# Patient Record
Sex: MALE | URBAN - METROPOLITAN AREA
[De-identification: names, ages, dates, MRNs, and addresses within clinical notes are randomized per-mention and may not be internally consistent; named-entity substitution may affect disease eponyms.]

---

## 2021-10-18 ENCOUNTER — NURSE TRIAGE (OUTPATIENT)
Dept: OTHER | Facility: OTHER | Age: 46
End: 2021-10-18

## 2021-10-18 DIAGNOSIS — Z20.822 SUSPECTED SEVERE ACUTE RESPIRATORY SYNDROME CORONAVIRUS 2 (SARS-COV-2) INFECTION: Primary | ICD-10-CM

## 2021-10-18 PROCEDURE — U0003 INFECTIOUS AGENT DETECTION BY NUCLEIC ACID (DNA OR RNA); SEVERE ACUTE RESPIRATORY SYNDROME CORONAVIRUS 2 (SARS-COV-2) (CORONAVIRUS DISEASE [COVID-19]), AMPLIFIED PROBE TECHNIQUE, MAKING USE OF HIGH THROUGHPUT TECHNOLOGIES AS DESCRIBED BY CMS-2020-01-R: HCPCS | Performed by: FAMILY MEDICINE

## 2021-10-18 PROCEDURE — U0005 INFEC AGEN DETEC AMPLI PROBE: HCPCS | Performed by: FAMILY MEDICINE

## 2021-10-19 LAB — SARS-COV-2 RNA RESP QL NAA+PROBE: NEGATIVE

## 2025-07-11 ENCOUNTER — OFFICE VISIT (OUTPATIENT)
Dept: URGENT CARE | Facility: CLINIC | Age: 50
End: 2025-07-11
Payer: COMMERCIAL

## 2025-07-11 VITALS
OXYGEN SATURATION: 98 % | HEART RATE: 83 BPM | HEIGHT: 73 IN | BODY MASS INDEX: 31.23 KG/M2 | SYSTOLIC BLOOD PRESSURE: 136 MMHG | TEMPERATURE: 97.6 F | DIASTOLIC BLOOD PRESSURE: 78 MMHG | WEIGHT: 235.6 LBS | RESPIRATION RATE: 20 BRPM

## 2025-07-11 DIAGNOSIS — S90.852A ACUTE FOREIGN BODY OF LEFT FOOT, INITIAL ENCOUNTER: Primary | ICD-10-CM

## 2025-07-11 PROCEDURE — 99213 OFFICE O/P EST LOW 20 MIN: CPT | Performed by: PHYSICIAN ASSISTANT

## 2025-07-11 RX ORDER — MUPIROCIN 2 %
OINTMENT (GRAM) TOPICAL 3 TIMES DAILY
Qty: 30 G | Refills: 0 | Status: SHIPPED | OUTPATIENT
Start: 2025-07-11

## 2025-07-11 RX ORDER — ATORVASTATIN CALCIUM 40 MG/1
TABLET, FILM COATED ORAL
COMMUNITY
Start: 2025-06-12

## 2025-07-11 NOTE — PATIENT INSTRUCTIONS
We discussed as it has been 4 weeks and there is no sign of infection at this time I will not attempt removal, the foreign body is not visualized or palpated. He can do warm epsom salt soaks and can apply Bactroban. Podiatry referral was given. We discussed red flag signs and ED precautions. The patient expresses understanding. He has declined Tdap at this time.

## 2025-07-11 NOTE — PROGRESS NOTES
[facility]        NAME: Marcelo Pagan is a 50 y.o. male  : 1975    MRN: [id number]  DATE: 2025  TIME: 11:07 AM    Assessment and Plan   Acute foreign body of left foot, initial encounter [S90.852A]  1. Acute foreign body of left foot, initial encounter  mupirocin (BACTROBAN) 2 % ointment    Ambulatory Referral to Podiatry            Patient Instructions   We discussed as it has been 4 weeks and there is no sign of infection at this time I will not attempt removal, the foreign body is not visualized or palpated. He can do warm epsom salt soaks and can apply Bactroban. Podiatry referral was given. We discussed red flag signs and ED precautions. The patient expresses understanding. He has declined Tdap at this time.     Follow up with PCP in 3-5 days.  Proceed to  ER if symptoms worsen.    If tests have been performed at [facility], our office will contact you with results if changes need to be made to the care plan discussed with you at the visit.  You can review your full results on [facility] MyChart.    Chief Complaint     Chief Complaint   Patient presents with    Foreign Body in Skin     Pt here for concerns of a splinter in  left foot pt states  in place x 4 weeks.  Pt states  it is below the skin and is tender. Pain  3/10.          History of Present Illness       Marcelo presents today for concern for foreign body in the left foot. The patient states that he feels he sustained a wood splinter in the foot four weeks ago. He states that he has hardwood floors, his foot slid and a thin piece of the wood went into over the sole of the foot. He would like to attempt removal today. He states that the area is mildly tender. No erythema, oozing or drainage. Gait is normal. He has full ROM of the toes. No fever or chills. No OTC measures attempted.     His last Tdap is unknown, he has declined updating at this time.         Review of Systems   Review of Systems   Constitutional:  Negative for  "activity change, appetite change, chills, fatigue and fever.   Musculoskeletal:  Negative for arthralgias, back pain, gait problem, joint swelling, myalgias, neck pain and neck stiffness.   Skin:  Positive for wound. Negative for color change, pallor and rash.   Allergic/Immunologic: Negative for immunocompromised state.   Neurological:  Negative for dizziness, weakness, light-headedness and numbness.   Hematological:  Does not bruise/bleed easily.         Current Medications     Current Medications[1]    Current Allergies     Allergies as of 07/11/2025    (No Known Allergies)            The following portions of the patient's history were reviewed and updated as appropriate: allergies, current medications, past family history, past medical history, past social history, past surgical history and problem list.     Past Medical History[2]    Past Surgical History[3]    Family History[4]      Medications have been verified.        Objective   /78 (Patient Position: Sitting, Cuff Size: Large)   Pulse 83   Temp 97.6 °F (36.4 °C) (Tympanic)   Resp 20   Ht 6' 1\" (1.854 m)   Wt 107 kg (235 lb 9.6 oz)   SpO2 98%   BMI 31.08 kg/m²   No LMP for male patient.       Physical Exam     Physical Exam  Vitals and nursing note reviewed.   Constitutional:       General: He is not in acute distress.     Appearance: He is well-developed. He is not ill-appearing, toxic-appearing or diaphoretic.     Cardiovascular:      Rate and Rhythm: Normal rate and regular rhythm.      Heart sounds: Normal heart sounds, S1 normal and S2 normal.   Pulmonary:      Effort: Pulmonary effort is normal.      Breath sounds: Normal breath sounds and air entry.     Musculoskeletal:      Left foot: Normal range of motion and normal capillary refill. Tenderness present. No swelling, deformity, laceration, bony tenderness or crepitus. Normal pulse.      Comments: Left Foot: There is tenderness over the head of the 3rd metatarsal on the volar aspect " of the foot where the patient states that splinter entered. No foreign body was visualized or palpated. There is no erythema or edema. No oozing or drainage. He has full ROM of the toes and ankle. He has normal gait. Normal sensation.      Skin:     General: Skin is warm and dry.      Capillary Refill: Capillary refill takes less than 2 seconds.      Findings: No bruising, ecchymosis or erythema.     Neurological:      General: No focal deficit present.      Mental Status: He is alert and oriented to person, place, and time.      Sensory: Sensation is intact. No sensory deficit.      Motor: Motor function is intact. No weakness or abnormal muscle tone.      Gait: Gait is intact. Gait normal.     Psychiatric:         Behavior: Behavior normal.                        [1]   Current Outpatient Medications:     mupirocin (BACTROBAN) 2 % ointment, Apply topically 3 (three) times a day, Disp: 30 g, Rfl: 0    atorvastatin (LIPITOR) 40 mg tablet, , Disp: , Rfl:   [2]   Past Medical History:  Diagnosis Date    High cholesterol    [3]   Past Surgical History:  Procedure Laterality Date    NO PAST SURGERIES     [4]   Family History  Problem Relation Name Age of Onset    Diabetes Mother      Lung cancer Mother

## 2025-07-15 ENCOUNTER — TELEPHONE (OUTPATIENT)
Age: 50
End: 2025-07-15

## 2025-07-17 ENCOUNTER — APPOINTMENT (OUTPATIENT)
Dept: RADIOLOGY | Facility: CLINIC | Age: 50
End: 2025-07-17
Attending: STUDENT IN AN ORGANIZED HEALTH CARE EDUCATION/TRAINING PROGRAM
Payer: COMMERCIAL

## 2025-07-17 ENCOUNTER — OFFICE VISIT (OUTPATIENT)
Age: 50
End: 2025-07-17
Attending: PHYSICIAN ASSISTANT
Payer: COMMERCIAL

## 2025-07-17 VITALS — WEIGHT: 235 LBS | BODY MASS INDEX: 31.14 KG/M2 | HEIGHT: 73 IN | RESPIRATION RATE: 18 BRPM

## 2025-07-17 DIAGNOSIS — S90.852A ACUTE FOREIGN BODY OF LEFT FOOT, INITIAL ENCOUNTER: ICD-10-CM

## 2025-07-17 DIAGNOSIS — E11.9 TYPE 2 DIABETES MELLITUS WITHOUT COMPLICATION, WITHOUT LONG-TERM CURRENT USE OF INSULIN (HCC): Primary | ICD-10-CM

## 2025-07-17 PROCEDURE — 99203 OFFICE O/P NEW LOW 30 MIN: CPT | Performed by: STUDENT IN AN ORGANIZED HEALTH CARE EDUCATION/TRAINING PROGRAM

## 2025-07-17 PROCEDURE — 73630 X-RAY EXAM OF FOOT: CPT

## 2025-07-17 PROCEDURE — 28190 REMOVAL OF FOOT FOREIGN BODY: CPT | Performed by: STUDENT IN AN ORGANIZED HEALTH CARE EDUCATION/TRAINING PROGRAM

## 2025-07-20 NOTE — PROGRESS NOTES
"Name: Marcelo Pagan      : 1975      MRN: [id number]  Encounter Provider: Brown Jay DPM  Encounter Date: 2025   Encounter department: [facility] [place]  :  Assessment & Plan  Acute foreign body of left foot, initial encounter    Orders:    Ambulatory Referral to Podiatry    XR foot 3+ vw left; Future    Foreign body removal  Marcelo and I discussed his left foot in detail.  Subcutaneous foreign body was removed as described below.  No local signs of infection noted, the small wound was redressed with Xeroform, a Band-Aid, and offloading padding.  Patient was counseled on local and systemic signs of infection and should call my office immediately should any of these occur.  He will return to my office in 2 weeks for reevaluation.  Weightbearing x-rays of the left foot from 2025 were interpreted independently prior to foreign body removal revealing no obvious signs of foreign body in the soft tissues.  Type 2 diabetes mellitus without complication, without long-term current use of insulin (HCC)    No results found for: \"HGBA1C\"    Orders:    Ambulatory Referral to Endocrinology; Future      Universal Protocol:  procedure performed by consultantConsent: Verbal consent obtained  Risks and benefits: risks, benefits and alternatives were discussed  Consent given by: patient  Time out: Immediately prior to procedure a \"time out\" was called to verify the correct patient, procedure, equipment, support staff and site/side marked as required.  Patient understanding: patient states understanding of the procedure being performed  Patient identity confirmed: verbally with patient  Foreign body removal    Date/Time: 2025 10:45 AM    Performed by: Brown Jay DPM  Authorized by: Brown Jay DPM  Body area: skin  General location: lower extremity  Location details: left foot    Sedation:  Patient sedated: no  Patient restrained: no  Patient cooperative: yes  Localization method: " "visualized  Removal mechanism: forceps and scalpel  Dressing: dressing applied  Tendon involvement: none  Depth: subcutaneous  Complexity: simple  1 objects recovered.  Objects recovered: Wooden splinter  Post-procedure assessment: foreign body removed  Patient tolerance: patient tolerated the procedure well with no immediate complications          History of Present Illness   7/17/2025: Cristopher is a pleasant 50-year-old male who presents today for evaluation of his left foot.  He believes that he got a splinter to his left foot approximately 6 weeks ago.  He has been soaking it with Epsom salts however there has been no relief.  There is still pain when he walks, he also states that whenever the skin above it is D removed pus comes out.  He denies any systemic signs of infection.          Review of Systems   Constitutional: Negative.    Respiratory: Negative.     Cardiovascular: Negative.    Gastrointestinal: Negative.    Genitourinary: Negative.    Skin:  Positive for color change and wound.          Objective   Resp 18   Ht 6' 1\" (1.854 m)   Wt 107 kg (235 lb)   BMI 31.00 kg/m²      Physical Exam  Constitutional:       Appearance: He is not ill-appearing.     Cardiovascular:      Rate and Rhythm: Normal rate.      Pulses:           Dorsalis pedis pulses are 2+ on the right side and 2+ on the left side.        Posterior tibial pulses are 2+ on the right side and 2+ on the left side.   Pulmonary:      Effort: No respiratory distress.     Neurological:      Mental Status: He is alert and oriented to person, place, and time.      Sensory: No sensory deficit.       Vascular:  -DP and PT pulses intact b/l  -Capillary refill time <2 sec b/l  -Digital hair growth: Present  -Skin temp: WNL    MSK:  -Pain on palpation left plantar foot  -No gross deformities noted   -MMT is 5/5 to all muscle compartments of the lower extremity  -Ankle dorsiflexion >10 degrees with knee extended and knee flexed b/l    Neuro:  -Light " sensation intact bilaterally  -Protective sensation intact bilaterally with 10/10 points felt with [place] monofilament    Derm:  -A small wound measuring 0.1 cm x 0.1 cm x 0.1 cm to the level of subcutaneous tissue where splinter was protruding.  There are no local signs of infection to this area.  -No noted interdigital maceration, peeling, malodor  -No callus formation noted on exam

## 2025-07-20 NOTE — ASSESSMENT & PLAN NOTE
Orders:    Ambulatory Referral to Podiatry    XR foot 3+ vw left; Future    Foreign body removal  Marcelo and I discussed his left foot in detail.  Subcutaneous foreign body was removed as described below.  No local signs of infection noted, the small wound was redressed with Xeroform, a Band-Aid, and offloading padding.  Patient was counseled on local and systemic signs of infection and should call my office immediately should any of these occur.  He will return to my office in 2 weeks for reevaluation.  Weightbearing x-rays of the left foot from 7/17/2025 were interpreted independently prior to foreign body removal revealing no obvious signs of foreign body in the soft tissues.

## 2025-07-31 ENCOUNTER — OFFICE VISIT (OUTPATIENT)
Age: 50
End: 2025-07-31
Attending: PHYSICIAN ASSISTANT
Payer: COMMERCIAL

## 2025-07-31 VITALS — WEIGHT: 235 LBS | BODY MASS INDEX: 31.14 KG/M2 | HEIGHT: 73 IN

## 2025-07-31 DIAGNOSIS — S90.852A ACUTE FOREIGN BODY OF LEFT FOOT, INITIAL ENCOUNTER: Primary | ICD-10-CM

## 2025-07-31 PROCEDURE — 99212 OFFICE O/P EST SF 10 MIN: CPT | Performed by: STUDENT IN AN ORGANIZED HEALTH CARE EDUCATION/TRAINING PROGRAM
